# Patient Record
Sex: MALE | Race: WHITE | NOT HISPANIC OR LATINO | Employment: FULL TIME | URBAN - METROPOLITAN AREA
[De-identification: names, ages, dates, MRNs, and addresses within clinical notes are randomized per-mention and may not be internally consistent; named-entity substitution may affect disease eponyms.]

---

## 2023-03-08 PROBLEM — I80.9 THROMBOPHLEBITIS: Status: ACTIVE | Noted: 2023-03-08

## 2023-03-08 PROBLEM — R53.83 FATIGUE: Status: ACTIVE | Noted: 2023-03-08

## 2023-03-08 PROBLEM — R04.0 RECURRENT EPISTAXIS: Status: ACTIVE | Noted: 2023-03-08

## 2023-03-08 PROBLEM — I73.9 PVD (PERIPHERAL VASCULAR DISEASE) (CMS-HCC): Status: ACTIVE | Noted: 2023-03-08

## 2023-03-08 PROBLEM — I10 HYPERTENSION: Status: ACTIVE | Noted: 2023-03-08

## 2023-03-08 PROBLEM — R73.9 BLOOD GLUCOSE ELEVATED: Status: ACTIVE | Noted: 2023-03-08

## 2023-03-08 PROBLEM — R19.7 DIARRHEA: Status: ACTIVE | Noted: 2023-03-08

## 2023-03-08 PROBLEM — K64.5 THROMBOSED EXTERNAL HEMORRHOID: Status: ACTIVE | Noted: 2023-03-08

## 2023-03-08 PROBLEM — M79.672 LEFT FOOT PAIN: Status: ACTIVE | Noted: 2023-03-08

## 2023-03-08 PROBLEM — K64.5 HEMORRHOID THROMBOSIS: Status: ACTIVE | Noted: 2023-03-08

## 2023-03-08 PROBLEM — G47.30 SLEEP APNEA: Status: ACTIVE | Noted: 2023-03-08

## 2023-03-08 PROBLEM — K62.5 BRIGHT RED BLOOD PER RECTUM: Status: ACTIVE | Noted: 2023-03-08

## 2023-03-08 PROBLEM — E55.9 VITAMIN D DEFICIENCY: Status: ACTIVE | Noted: 2023-03-08

## 2023-03-08 PROBLEM — M79.89 LIMB SWELLING: Status: ACTIVE | Noted: 2023-03-08

## 2023-03-08 PROBLEM — K57.30 DIVERTICULOSIS OF LARGE INTESTINE WITHOUT HEMORRHAGE: Status: ACTIVE | Noted: 2023-03-08

## 2023-03-08 PROBLEM — K64.4 EXTERNAL HEMORRHOIDS: Status: ACTIVE | Noted: 2023-03-08

## 2023-03-08 PROBLEM — M25.579 ANKLE PAIN: Status: ACTIVE | Noted: 2023-03-08

## 2023-03-08 PROBLEM — M25.539 WRIST PAIN: Status: ACTIVE | Noted: 2023-03-08

## 2023-03-08 PROBLEM — M21.42 ACQUIRED PES PLANUS OF BOTH FEET: Status: ACTIVE | Noted: 2023-03-08

## 2023-03-08 PROBLEM — I42.9 CARDIOMYOPATHY (MULTI): Status: ACTIVE | Noted: 2023-03-08

## 2023-03-08 PROBLEM — R05.9 COUGH IN ADULT: Status: ACTIVE | Noted: 2023-03-08

## 2023-03-08 PROBLEM — I25.10 ATHEROSCLEROTIC CARDIOVASCULAR DISEASE: Status: ACTIVE | Noted: 2023-03-08

## 2023-03-08 PROBLEM — R04.0 BLOODY NOSE: Status: ACTIVE | Noted: 2023-03-08

## 2023-03-08 PROBLEM — M18.9 CMC ARTHRITIS, THUMB, DEGENERATIVE: Status: ACTIVE | Noted: 2023-03-08

## 2023-03-08 PROBLEM — R79.89 ELEVATED VITAMIN B12 LEVEL: Status: ACTIVE | Noted: 2023-03-08

## 2023-03-08 PROBLEM — R73.03 PRE-DIABETES: Status: ACTIVE | Noted: 2023-03-08

## 2023-03-08 PROBLEM — M21.41 ACQUIRED PES PLANUS OF BOTH FEET: Status: ACTIVE | Noted: 2023-03-08

## 2023-03-08 PROBLEM — I72.4 POPLITEAL ARTERY ANEURYSM (CMS-HCC): Status: ACTIVE | Noted: 2023-03-08

## 2023-03-08 PROBLEM — K52.9 GASTROENTERITIS: Status: ACTIVE | Noted: 2023-03-08

## 2023-03-08 PROBLEM — E11.42 DIABETIC PERIPHERAL NEUROPATHY (MULTI): Status: ACTIVE | Noted: 2023-03-08

## 2023-03-08 PROBLEM — M79.606 LEG PAIN: Status: ACTIVE | Noted: 2023-03-08

## 2023-03-08 PROBLEM — D12.6 TUBULAR ADENOMA OF COLON: Status: ACTIVE | Noted: 2023-03-08

## 2023-03-08 PROBLEM — R74.8 ELEVATED VITAMIN B12 LEVEL: Status: ACTIVE | Noted: 2023-03-08

## 2023-03-08 PROBLEM — Z20.822 EXPOSURE TO COVID-19 VIRUS: Status: ACTIVE | Noted: 2023-03-08

## 2023-03-08 RX ORDER — ASCORBIC ACID 500 MG
TABLET,CHEWABLE ORAL
COMMUNITY
End: 2024-03-12 | Stop reason: WASHOUT

## 2023-03-08 RX ORDER — NEBIVOLOL 10 MG/1
1 TABLET ORAL DAILY
COMMUNITY
Start: 2017-03-24 | End: 2023-08-18 | Stop reason: SDUPTHER

## 2023-03-08 RX ORDER — INDOMETHACIN 75 MG/1
75 CAPSULE, EXTENDED RELEASE ORAL 2 TIMES DAILY
COMMUNITY
Start: 2022-06-14 | End: 2024-03-12 | Stop reason: WASHOUT

## 2023-03-08 RX ORDER — METFORMIN HYDROCHLORIDE 1000 MG/1
1 TABLET ORAL
COMMUNITY
Start: 2020-11-24 | End: 2023-09-05 | Stop reason: SDUPTHER

## 2023-03-08 RX ORDER — ATORVASTATIN CALCIUM 80 MG/1
1 TABLET, FILM COATED ORAL DAILY
COMMUNITY
Start: 2021-06-24 | End: 2023-10-18 | Stop reason: SDUPTHER

## 2023-03-08 RX ORDER — TURMERIC ROOT EXTRACT 500 MG
TABLET ORAL
COMMUNITY
Start: 2019-09-20 | End: 2023-03-15 | Stop reason: ALTCHOICE

## 2023-03-08 RX ORDER — ESOMEPRAZOLE MAGNESIUM 20 MG/1
TABLET, DELAYED RELEASE ORAL
COMMUNITY

## 2023-03-08 RX ORDER — MULTIVITAMIN
TABLET ORAL
COMMUNITY

## 2023-03-08 RX ORDER — CIPROFLOXACIN 250 MG/1
1 TABLET, FILM COATED ORAL 2 TIMES DAILY
COMMUNITY
Start: 2022-06-03 | End: 2023-03-10 | Stop reason: ALTCHOICE

## 2023-03-10 LAB
ALANINE AMINOTRANSFERASE (SGPT) (U/L) IN SER/PLAS: 32 U/L (ref 10–52)
ANION GAP IN SER/PLAS: 13 MMOL/L (ref 10–20)
APPEARANCE, URINE: ABNORMAL
BASOPHILS (10*3/UL) IN BLOOD BY AUTOMATED COUNT: 0.09 X10E9/L (ref 0–0.1)
BASOPHILS/100 LEUKOCYTES IN BLOOD BY AUTOMATED COUNT: 1 % (ref 0–2)
BILIRUBIN, URINE: NEGATIVE
BLOOD, URINE: NEGATIVE
CALCIDIOL (25 OH VITAMIN D3) (NG/ML) IN SER/PLAS: 36 NG/ML
CALCIUM (MG/DL) IN SER/PLAS: 9.8 MG/DL (ref 8.6–10.6)
CARBON DIOXIDE, TOTAL (MMOL/L) IN SER/PLAS: 28 MMOL/L (ref 21–32)
CHLORIDE (MMOL/L) IN SER/PLAS: 105 MMOL/L (ref 98–107)
CHOLESTEROL (MG/DL) IN SER/PLAS: 113 MG/DL (ref 0–199)
CHOLESTEROL IN HDL (MG/DL) IN SER/PLAS: 38.5 MG/DL
CHOLESTEROL/HDL RATIO: 2.9
COBALAMIN (VITAMIN B12) (PG/ML) IN SER/PLAS: 591 PG/ML (ref 211–911)
COLOR, URINE: ABNORMAL
CREATININE (MG/DL) IN SER/PLAS: 1.01 MG/DL (ref 0.5–1.3)
EOSINOPHILS (10*3/UL) IN BLOOD BY AUTOMATED COUNT: 0.25 X10E9/L (ref 0–0.7)
EOSINOPHILS/100 LEUKOCYTES IN BLOOD BY AUTOMATED COUNT: 2.9 % (ref 0–6)
ERYTHROCYTE DISTRIBUTION WIDTH (RATIO) BY AUTOMATED COUNT: 14.7 % (ref 11.5–14.5)
ERYTHROCYTE MEAN CORPUSCULAR HEMOGLOBIN CONCENTRATION (G/DL) BY AUTOMATED: 31.2 G/DL (ref 32–36)
ERYTHROCYTE MEAN CORPUSCULAR VOLUME (FL) BY AUTOMATED COUNT: 89 FL (ref 80–100)
ERYTHROCYTES (10*6/UL) IN BLOOD BY AUTOMATED COUNT: 4.93 X10E12/L (ref 4.5–5.9)
ESTIMATED AVERAGE GLUCOSE FOR HBA1C: 137 MG/DL
GFR MALE: 88 ML/MIN/1.73M2
GLUCOSE (MG/DL) IN SER/PLAS: 107 MG/DL (ref 74–99)
GLUCOSE, URINE: NEGATIVE MG/DL
HEMATOCRIT (%) IN BLOOD BY AUTOMATED COUNT: 43.9 % (ref 41–52)
HEMOGLOBIN (G/DL) IN BLOOD: 13.7 G/DL (ref 13.5–17.5)
HEMOGLOBIN A1C/HEMOGLOBIN TOTAL IN BLOOD: 6.4 %
IMMATURE GRANULOCYTES/100 LEUKOCYTES IN BLOOD BY AUTOMATED COUNT: 0.3 % (ref 0–0.9)
KETONES, URINE: ABNORMAL MG/DL
LDL: 45 MG/DL (ref 0–99)
LEUKOCYTE ESTERASE, URINE: NEGATIVE
LEUKOCYTES (10*3/UL) IN BLOOD BY AUTOMATED COUNT: 8.6 X10E9/L (ref 4.4–11.3)
LYMPHOCYTES (10*3/UL) IN BLOOD BY AUTOMATED COUNT: 2.41 X10E9/L (ref 1.2–4.8)
LYMPHOCYTES/100 LEUKOCYTES IN BLOOD BY AUTOMATED COUNT: 28 % (ref 13–44)
MONOCYTES (10*3/UL) IN BLOOD BY AUTOMATED COUNT: 0.58 X10E9/L (ref 0.1–1)
MONOCYTES/100 LEUKOCYTES IN BLOOD BY AUTOMATED COUNT: 6.7 % (ref 2–10)
NEUTROPHILS (10*3/UL) IN BLOOD BY AUTOMATED COUNT: 5.24 X10E9/L (ref 1.2–7.7)
NEUTROPHILS/100 LEUKOCYTES IN BLOOD BY AUTOMATED COUNT: 61.1 % (ref 40–80)
NITRITE, URINE: NEGATIVE
NRBC (PER 100 WBCS) BY AUTOMATED COUNT: 0 /100 WBC (ref 0–0)
PH, URINE: 5 (ref 5–8)
PLATELETS (10*3/UL) IN BLOOD AUTOMATED COUNT: 192 X10E9/L (ref 150–450)
POTASSIUM (MMOL/L) IN SER/PLAS: 5 MMOL/L (ref 3.5–5.3)
PROSTATE SPECIFIC AG (NG/ML) IN SER/PLAS: 1.01 NG/ML (ref 0–4)
PROTEIN, URINE: NEGATIVE MG/DL
SODIUM (MMOL/L) IN SER/PLAS: 141 MMOL/L (ref 136–145)
SPECIFIC GRAVITY, URINE: 1.03 (ref 1–1.03)
THYROTROPIN (MIU/L) IN SER/PLAS BY DETECTION LIMIT <= 0.05 MIU/L: 1.13 MIU/L (ref 0.44–3.98)
TRIGLYCERIDE (MG/DL) IN SER/PLAS: 147 MG/DL (ref 0–149)
UREA NITROGEN (MG/DL) IN SER/PLAS: 18 MG/DL (ref 6–23)
UROBILINOGEN, URINE: 2 MG/DL (ref 0–1.9)
VLDL: 29 MG/DL (ref 0–40)

## 2023-03-15 ENCOUNTER — OFFICE VISIT (OUTPATIENT)
Dept: PRIMARY CARE | Facility: CLINIC | Age: 58
End: 2023-03-15
Payer: COMMERCIAL

## 2023-03-15 VITALS
HEIGHT: 75 IN | RESPIRATION RATE: 16 BRPM | HEART RATE: 66 BPM | WEIGHT: 213 LBS | DIASTOLIC BLOOD PRESSURE: 68 MMHG | BODY MASS INDEX: 26.49 KG/M2 | SYSTOLIC BLOOD PRESSURE: 124 MMHG | OXYGEN SATURATION: 97 % | TEMPERATURE: 98 F

## 2023-03-15 DIAGNOSIS — Z00.00 WELL ADULT EXAM: ICD-10-CM

## 2023-03-15 DIAGNOSIS — R60.9 EDEMA, UNSPECIFIED TYPE: ICD-10-CM

## 2023-03-15 DIAGNOSIS — R11.0 NAUSEA: Primary | ICD-10-CM

## 2023-03-15 PROCEDURE — 3078F DIAST BP <80 MM HG: CPT | Performed by: INTERNAL MEDICINE

## 2023-03-15 PROCEDURE — 99396 PREV VISIT EST AGE 40-64: CPT | Performed by: INTERNAL MEDICINE

## 2023-03-15 PROCEDURE — 1036F TOBACCO NON-USER: CPT | Performed by: INTERNAL MEDICINE

## 2023-03-15 PROCEDURE — 3074F SYST BP LT 130 MM HG: CPT | Performed by: INTERNAL MEDICINE

## 2023-03-15 PROCEDURE — 3044F HG A1C LEVEL LT 7.0%: CPT | Performed by: INTERNAL MEDICINE

## 2023-03-15 PROCEDURE — 4010F ACE/ARB THERAPY RXD/TAKEN: CPT | Performed by: INTERNAL MEDICINE

## 2023-03-15 RX ORDER — METHYLPREDNISOLONE 4 MG/1
TABLET ORAL
COMMUNITY
Start: 2022-06-14 | End: 2023-03-15 | Stop reason: ALTCHOICE

## 2023-03-15 RX ORDER — AMOXICILLIN 500 MG/1
CAPSULE ORAL
COMMUNITY
Start: 2022-12-13 | End: 2023-03-15 | Stop reason: ALTCHOICE

## 2023-03-15 RX ORDER — POLYETHYLENE GLYCOL 3350, SODIUM CHLORIDE, SODIUM BICARBONATE, POTASSIUM CHLORIDE 420; 11.2; 5.72; 1.48 G/4L; G/4L; G/4L; G/4L
POWDER, FOR SOLUTION ORAL
COMMUNITY
Start: 2022-08-16 | End: 2024-03-12 | Stop reason: WASHOUT

## 2023-03-15 RX ORDER — OLMESARTAN MEDOXOMIL 20 MG/1
TABLET ORAL
COMMUNITY
Start: 2023-03-08 | End: 2023-12-20 | Stop reason: SDUPTHER

## 2023-03-15 NOTE — ASSESSMENT & PLAN NOTE
ASSESSMENT AND PLAN: Patient on examination is in good health, will do screening blood tests to screen for high cholesterol, diabetes, thyroid. Patient should be taking enough calcium in a balanced diet or supplements to total 1200 mg a day in divided doses unless with history of specific types of kidney stones. Vitamin D 800-1000 iu a day, check levels if not taking any supplements. For Male Patients Only: Prostate cancer screening PSA. Preventive Medicine: colon cancer screening by age 50 if no family history, balanced diet, and exercise as discussed. Seat belt use for injury prevention, living will. Substance use and /or tobacco use counseled when applicable. Alcohol use discussed, use designated . Immunizations TD age 50 and every 10 years. Pneumovax and shingles vaccine counseled. Yearly flu vaccine unless contraindicated. More than 50% of office visit time spent counseling the patient, questions were answered. If problems arise, patient is to call or come back in. It is understood that the responsibility of healthcare is shared by the patient by following a healthy lifestyle and following the plan above as discussed. Complete physical examination in a year.Patient  knows to call for lab results in two weeks if he does not receive letter or call from our office.

## 2023-03-15 NOTE — PROGRESS NOTES
Patient is here for his annual exam concerned about weight loss, and having some stomach issues had bypass surgery 2009 and is worried, also has neuropathy and is wanting a specialist

## 2023-03-15 NOTE — PROGRESS NOTES
"Subjective   Patient ID: David A Schuellerman is a 57 y.o. male who presents for Annual Exam.    Patient comes in for a physical examination, doing well over - all with no particular complaints.   Also in for laboratory review and health maintenance update.  Updating family history as well.    Review of Systems    Objective   Physical Exam  Constitutional:       Appearance: Normal appearance.   HENT:      Head: Normocephalic and atraumatic.      Nose: Nose normal.   Cardiovascular:      Rate and Rhythm: Normal rate and regular rhythm.   Abdominal:      General: Abdomen is flat.      Palpations: Abdomen is soft.   Musculoskeletal:         General: Normal range of motion.      Cervical back: Normal range of motion.   Skin:     General: Skin is warm and dry.   Neurological:      Mental Status: He is alert.       /68   Pulse 66   Temp 36.7 °C (98 °F)   Resp 16   Ht 1.905 m (6' 3\")   Wt 96.6 kg (213 lb)   SpO2 97%   BMI 26.62 kg/m²         Assessment/Plan   Problem List Items Addressed This Visit          Digestive    Nausea - Primary    Relevant Orders    Referral to Gastroenterology       Other    Well adult exam     ASSESSMENT AND PLAN: Patient on examination is in good health, will do screening blood tests to screen for high cholesterol, diabetes, thyroid. Patient should be taking enough calcium in a balanced diet or supplements to total 1200 mg a day in divided doses unless with history of specific types of kidney stones. Vitamin D 800-1000 iu a day, check levels if not taking any supplements. For Male Patients Only: Prostate cancer screening PSA. Preventive Medicine: colon cancer screening by age 50 if no family history, balanced diet, and exercise as discussed. Seat belt use for injury prevention, living will. Substance use and /or tobacco use counseled when applicable. Alcohol use discussed, use designated . Immunizations TD age 50 and every 10 years. Pneumovax and shingles vaccine counseled. " Yearly flu vaccine unless contraindicated. More than 50% of office visit time spent counseling the patient, questions were answered. If problems arise, patient is to call or come back in. It is understood that the responsibility of healthcare is shared by the patient by following a healthy lifestyle and following the plan above as discussed. Complete physical examination in a year.Patient  knows to call for lab results in two weeks if he does not receive letter or call from our office.

## 2023-03-23 ENCOUNTER — TELEPHONE (OUTPATIENT)
Dept: PRIMARY CARE | Facility: CLINIC | Age: 58
End: 2023-03-23
Payer: COMMERCIAL

## 2023-03-23 NOTE — TELEPHONE ENCOUNTER
Patient called in asked if there was an alternative for his generic bystolic, it is $400.00 at pharm for 90 days, has been out of medication for two days now, thought pharm sent us a message, Advise?

## 2023-06-06 ENCOUNTER — HOSPITAL ENCOUNTER (OUTPATIENT)
Dept: DATA CONVERSION | Facility: HOSPITAL | Age: 58
End: 2023-06-06
Attending: INTERNAL MEDICINE | Admitting: INTERNAL MEDICINE
Payer: COMMERCIAL

## 2023-06-06 DIAGNOSIS — Z87.891 PERSONAL HISTORY OF NICOTINE DEPENDENCE: ICD-10-CM

## 2023-06-06 DIAGNOSIS — Z98.84 BARIATRIC SURGERY STATUS: ICD-10-CM

## 2023-06-06 DIAGNOSIS — G47.33 OBSTRUCTIVE SLEEP APNEA (ADULT) (PEDIATRIC): ICD-10-CM

## 2023-06-06 DIAGNOSIS — D50.0 IRON DEFICIENCY ANEMIA SECONDARY TO BLOOD LOSS (CHRONIC): ICD-10-CM

## 2023-06-06 DIAGNOSIS — Z12.11 ENCOUNTER FOR SCREENING FOR MALIGNANT NEOPLASM OF COLON: ICD-10-CM

## 2023-06-06 DIAGNOSIS — I10 ESSENTIAL (PRIMARY) HYPERTENSION: ICD-10-CM

## 2023-06-06 DIAGNOSIS — E11.51 TYPE 2 DIABETES MELLITUS WITH DIABETIC PERIPHERAL ANGIOPATHY WITHOUT GANGRENE (MULTI): ICD-10-CM

## 2023-06-06 DIAGNOSIS — Z79.84 LONG TERM (CURRENT) USE OF ORAL HYPOGLYCEMIC DRUGS: ICD-10-CM

## 2023-06-06 DIAGNOSIS — K21.9 GASTRO-ESOPHAGEAL REFLUX DISEASE WITHOUT ESOPHAGITIS: ICD-10-CM

## 2023-06-06 DIAGNOSIS — K57.30 DIVERTICULOSIS OF LARGE INTESTINE WITHOUT PERFORATION OR ABSCESS WITHOUT BLEEDING: ICD-10-CM

## 2023-06-06 DIAGNOSIS — I42.9 CARDIOMYOPATHY, UNSPECIFIED (MULTI): ICD-10-CM

## 2023-06-06 LAB — POCT GLUCOSE: 111 MG/DL (ref 74–99)

## 2023-08-18 DIAGNOSIS — I10 HYPERTENSION, UNSPECIFIED TYPE: Primary | ICD-10-CM

## 2023-08-18 RX ORDER — NEBIVOLOL 10 MG/1
10 TABLET ORAL DAILY
Qty: 90 TABLET | Refills: 1 | Status: SHIPPED | OUTPATIENT
Start: 2023-08-18 | End: 2024-01-23

## 2023-09-05 DIAGNOSIS — E11.65 TYPE 2 DIABETES MELLITUS WITH HYPERGLYCEMIA, WITHOUT LONG-TERM CURRENT USE OF INSULIN (MULTI): Primary | ICD-10-CM

## 2023-09-05 RX ORDER — METFORMIN HYDROCHLORIDE 1000 MG/1
1000 TABLET ORAL
Qty: 180 TABLET | Refills: 1 | Status: SHIPPED | OUTPATIENT
Start: 2023-09-05 | End: 2024-03-08

## 2023-10-18 ENCOUNTER — TELEPHONE (OUTPATIENT)
Dept: PRIMARY CARE | Facility: CLINIC | Age: 58
End: 2023-10-18
Payer: COMMERCIAL

## 2023-10-18 DIAGNOSIS — E78.2 MIXED HYPERLIPIDEMIA: Primary | ICD-10-CM

## 2023-10-18 RX ORDER — ATORVASTATIN CALCIUM 80 MG/1
80 TABLET, FILM COATED ORAL DAILY
Qty: 90 TABLET | Refills: 1 | Status: SHIPPED | OUTPATIENT
Start: 2023-10-18 | End: 2024-03-19

## 2023-12-18 RX ORDER — OLMESARTAN MEDOXOMIL 20 MG/1
20 TABLET ORAL DAILY
Qty: 90 TABLET | OUTPATIENT
Start: 2023-12-18

## 2023-12-20 DIAGNOSIS — I10 HYPERTENSION, UNSPECIFIED TYPE: Primary | ICD-10-CM

## 2023-12-20 RX ORDER — OLMESARTAN MEDOXOMIL 20 MG/1
20 TABLET ORAL DAILY
Qty: 90 TABLET | Refills: 0 | Status: SHIPPED | OUTPATIENT
Start: 2023-12-20 | End: 2024-03-19

## 2023-12-20 NOTE — TELEPHONE ENCOUNTER
Pt needs scheduled for his year visit he was last seen 10/4/22 (he is overdue)  and a stress echo per last office note from Dr. Bradshaw.  I put the order into the system.

## 2024-01-02 ENCOUNTER — OFFICE VISIT (OUTPATIENT)
Dept: PRIMARY CARE | Facility: CLINIC | Age: 59
End: 2024-01-02
Payer: COMMERCIAL

## 2024-01-02 VITALS
OXYGEN SATURATION: 99 % | HEIGHT: 75 IN | BODY MASS INDEX: 26.36 KG/M2 | HEART RATE: 60 BPM | DIASTOLIC BLOOD PRESSURE: 70 MMHG | RESPIRATION RATE: 16 BRPM | SYSTOLIC BLOOD PRESSURE: 118 MMHG | TEMPERATURE: 98.2 F | WEIGHT: 212 LBS

## 2024-01-02 DIAGNOSIS — Z48.02 VISIT FOR SUTURE REMOVAL: Primary | ICD-10-CM

## 2024-01-02 PROCEDURE — 3078F DIAST BP <80 MM HG: CPT | Performed by: INTERNAL MEDICINE

## 2024-01-02 PROCEDURE — 3074F SYST BP LT 130 MM HG: CPT | Performed by: INTERNAL MEDICINE

## 2024-01-02 PROCEDURE — 1036F TOBACCO NON-USER: CPT | Performed by: INTERNAL MEDICINE

## 2024-01-02 PROCEDURE — 99213 OFFICE O/P EST LOW 20 MIN: CPT | Performed by: INTERNAL MEDICINE

## 2024-01-02 PROCEDURE — 4010F ACE/ARB THERAPY RXD/TAKEN: CPT | Performed by: INTERNAL MEDICINE

## 2024-01-02 NOTE — PROGRESS NOTES
"Subjective   Patient ID: David A Schuellerman is a 58 y.o. male who presents for Suture / Staple Removal.  HPI    Review of Systems    Objective   Physical Exam  /70   Pulse 60   Temp 36.8 °C (98.2 °F)   Resp 16   Ht 1.905 m (6' 3\")   Wt 96.2 kg (212 lb)   SpO2 99%   BMI 26.50 kg/m²         Assessment/Plan   Problem List Items Addressed This Visit    None       Suture removal will follow,   "

## 2024-01-19 DIAGNOSIS — I10 HYPERTENSION, UNSPECIFIED TYPE: ICD-10-CM

## 2024-01-23 DIAGNOSIS — I10 HYPERTENSION, UNSPECIFIED TYPE: ICD-10-CM

## 2024-01-23 RX ORDER — BETAXOLOL 20 MG/1
20 TABLET, FILM COATED ORAL DAILY
Qty: 1 TABLET | Refills: 0 | Status: SHIPPED | OUTPATIENT
Start: 2024-01-23 | End: 2024-01-24

## 2024-01-24 RX ORDER — BETAXOLOL 20 MG/1
20 TABLET, FILM COATED ORAL DAILY
Qty: 1 TABLET | Refills: 0 | Status: SHIPPED | OUTPATIENT
Start: 2024-01-24 | End: 2024-02-13

## 2024-01-25 ENCOUNTER — APPOINTMENT (OUTPATIENT)
Dept: CARDIOLOGY | Facility: CLINIC | Age: 59
End: 2024-01-25
Payer: COMMERCIAL

## 2024-02-02 ENCOUNTER — TELEPHONE (OUTPATIENT)
Dept: PRIMARY CARE | Facility: CLINIC | Age: 59
End: 2024-02-02
Payer: COMMERCIAL

## 2024-02-02 DIAGNOSIS — I1A.0 RESISTANT HYPERTENSION: Primary | ICD-10-CM

## 2024-02-02 NOTE — TELEPHONE ENCOUNTER
Pt cannot recall name of Rx, but said [facility] tried to contact us to refill.  Advised to pt that we would investigate this afternoon and CB.  Asked pt to CB w name of Rx if he can find it.

## 2024-02-05 RX ORDER — NEBIVOLOL 10 MG/1
10 TABLET ORAL DAILY
Qty: 90 TABLET | Refills: 1 | Status: SHIPPED | OUTPATIENT
Start: 2024-02-05 | End: 2024-02-09

## 2024-02-05 RX ORDER — NEBIVOLOL 10 MG/1
10 TABLET ORAL DAILY
COMMUNITY
End: 2024-02-05 | Stop reason: SDUPTHER

## 2024-02-06 ENCOUNTER — TELEPHONE (OUTPATIENT)
Dept: PRIMARY CARE | Facility: CLINIC | Age: 59
End: 2024-02-06
Payer: COMMERCIAL

## 2024-02-07 ENCOUNTER — TELEPHONE (OUTPATIENT)
Dept: CARDIOLOGY | Facility: CLINIC | Age: 59
End: 2024-02-07
Payer: COMMERCIAL

## 2024-02-07 NOTE — TELEPHONE ENCOUNTER
LM for pt and went over stress prep. Please hold for 24 hrs prior Betaxolol and bystolic, NPO 2 hrs

## 2024-02-09 DIAGNOSIS — I1A.0 RESISTANT HYPERTENSION: ICD-10-CM

## 2024-02-09 RX ORDER — BETAXOLOL 20 MG/1
20 TABLET, FILM COATED ORAL DAILY
Qty: 1 TABLET | Refills: 0 | Status: SHIPPED | OUTPATIENT
Start: 2024-02-09 | End: 2024-02-13

## 2024-02-09 NOTE — TELEPHONE ENCOUNTER
Pt's wife called regarding Nebivolol auth.  Wants you to have   [facility] fax for [place] trial Rx:  [phone]

## 2024-02-12 DIAGNOSIS — I1A.0 RESISTANT HYPERTENSION: ICD-10-CM

## 2024-02-13 ENCOUNTER — APPOINTMENT (OUTPATIENT)
Dept: CARDIOLOGY | Facility: CLINIC | Age: 59
End: 2024-02-13
Payer: COMMERCIAL

## 2024-02-13 RX ORDER — NEBIVOLOL 10 MG/1
10 TABLET ORAL DAILY
Qty: 90 TABLET | Refills: 3 | Status: SHIPPED | OUTPATIENT
Start: 2024-02-13 | End: 2025-02-12

## 2024-03-06 ENCOUNTER — TELEPHONE (OUTPATIENT)
Dept: CARDIOLOGY | Facility: CLINIC | Age: 59
End: 2024-03-06
Payer: COMMERCIAL

## 2024-03-06 NOTE — TELEPHONE ENCOUNTER
Spoke with pt and went over stress prep. NPO 2 hrs prior, hold bystolic and betaxolol 24 hrs prior

## 2024-03-08 DIAGNOSIS — E11.65 TYPE 2 DIABETES MELLITUS WITH HYPERGLYCEMIA, WITHOUT LONG-TERM CURRENT USE OF INSULIN (MULTI): ICD-10-CM

## 2024-03-08 RX ORDER — METFORMIN HYDROCHLORIDE 1000 MG/1
1000 TABLET ORAL
Qty: 60 TABLET | Refills: 5 | Status: SHIPPED | OUTPATIENT
Start: 2024-03-08

## 2024-03-12 ENCOUNTER — HOSPITAL ENCOUNTER (OUTPATIENT)
Dept: CARDIOLOGY | Facility: CLINIC | Age: 59
Discharge: HOME | End: 2024-03-12
Payer: COMMERCIAL

## 2024-03-12 ENCOUNTER — OFFICE VISIT (OUTPATIENT)
Dept: CARDIOLOGY | Facility: CLINIC | Age: 59
End: 2024-03-12
Payer: COMMERCIAL

## 2024-03-12 VITALS
HEIGHT: 75 IN | WEIGHT: 209 LBS | HEART RATE: 66 BPM | SYSTOLIC BLOOD PRESSURE: 144 MMHG | BODY MASS INDEX: 25.99 KG/M2 | DIASTOLIC BLOOD PRESSURE: 84 MMHG

## 2024-03-12 VITALS
DIASTOLIC BLOOD PRESSURE: 84 MMHG | HEIGHT: 75 IN | WEIGHT: 209 LBS | HEART RATE: 73 BPM | BODY MASS INDEX: 25.99 KG/M2 | SYSTOLIC BLOOD PRESSURE: 144 MMHG

## 2024-03-12 DIAGNOSIS — R93.1 AGATSTON CORONARY ARTERY CALCIUM SCORE GREATER THAN 400: Primary | ICD-10-CM

## 2024-03-12 DIAGNOSIS — I71.21 ANEURYSM OF ASCENDING AORTA WITHOUT RUPTURE (CMS-HCC): ICD-10-CM

## 2024-03-12 DIAGNOSIS — I10 PRIMARY HYPERTENSION: ICD-10-CM

## 2024-03-12 DIAGNOSIS — I25.10 ATHEROSCLEROSIS OF NATIVE CORONARY ARTERY OF NATIVE HEART, UNSPECIFIED WHETHER ANGINA PRESENT: ICD-10-CM

## 2024-03-12 DIAGNOSIS — E78.00 HYPERCHOLESTEREMIA: ICD-10-CM

## 2024-03-12 DIAGNOSIS — I72.4 POPLITEAL ARTERY ANEURYSM (CMS-HCC): ICD-10-CM

## 2024-03-12 PROCEDURE — 93017 CV STRESS TEST TRACING ONLY: CPT

## 2024-03-12 PROCEDURE — 99214 OFFICE O/P EST MOD 30 MIN: CPT | Performed by: INTERNAL MEDICINE

## 2024-03-12 PROCEDURE — 4010F ACE/ARB THERAPY RXD/TAKEN: CPT | Performed by: INTERNAL MEDICINE

## 2024-03-12 PROCEDURE — 93016 CV STRESS TEST SUPVJ ONLY: CPT | Performed by: INTERNAL MEDICINE

## 2024-03-12 PROCEDURE — 3077F SYST BP >= 140 MM HG: CPT | Performed by: INTERNAL MEDICINE

## 2024-03-12 PROCEDURE — 3079F DIAST BP 80-89 MM HG: CPT | Performed by: INTERNAL MEDICINE

## 2024-03-12 PROCEDURE — 93350 STRESS TTE ONLY: CPT | Performed by: INTERNAL MEDICINE

## 2024-03-12 PROCEDURE — 93018 CV STRESS TEST I&R ONLY: CPT | Performed by: INTERNAL MEDICINE

## 2024-03-12 PROCEDURE — 1036F TOBACCO NON-USER: CPT | Performed by: INTERNAL MEDICINE

## 2024-03-12 NOTE — PROGRESS NOTES
Chief Complaint:   Coronary Artery Disease     History of Present Illness     David A Schuellerman is a 58 y.o. male presenting with for follow-up of preclinical coronary artery disease detected by coronary artery calcium scoring.   The patient is tolerating guideline-directed medical therapy with antiplatelet and statin medication and is compliant.  The patient exercises intermittently and follows a heart healthy diet.  The patient has been well since their last office appointment and is not having any anginal symptoms or dyspnea on exertion.      Review of Systems  All pertinent systems have been reviewed and are negative except for what is stated in the history of present illness.    All other systems have been reviewed and are negative and noncontributory to this patient's current ailments.  .       Previous History     Past Medical History:  He has a past medical history of Agatston coronary artery calcium score greater than 400 (03/12/2024), Aneurysm of ascending aorta without rupture (CMS/HCC) (03/12/2024), Hypercholesteremia (03/12/2024), and Morbid (severe) obesity due to excess calories (CMS/HCC).    Past Surgical History:  He has a past surgical history that includes Gastric bypass (12/08/2015); Other surgical history (11/05/2020); and CT angio abdomen pelvis w and or wo IV IV contrast (6/19/2021).      Social History:  He reports that he has quit smoking. His smoking use included cigarettes. He has never used smokeless tobacco. He reports that he does not currently use alcohol. He reports current drug use. Drug: Marijuana.    Family History:  Family History   Problem Relation Name Age of Onset    Colon cancer Mother      Other (CVA) Father      Hypertension Father          Allergies:  Oxycodone    Outpatient Medications:  Current Outpatient Medications   Medication Instructions    ASPIRIN ORAL 81 MG TABS    atorvastatin (LIPITOR) 80 mg, oral, Daily    esomeprazole magnesium (NexIUM 24HR) 20 mg  "tablet,delayed release (DR/EC) NexIUM 24HR TBEC   Refills: 0       Active    metFORMIN (GLUCOPHAGE) 1,000 mg, oral, 2 times daily with meals    multivitamin (Multiple Vitamins) tablet Multiple Vitamins Oral Tablet   Refills: 0       Active    nebivolol (BYSTOLIC) 10 mg, oral, Daily    olmesartan (BENICAR) 20 mg, oral, Daily       Physical Examination   Vitals:  Visit Vitals  /84 (BP Location: Right arm)   Pulse 73   Ht 1.905 m (6' 3\")   Wt 94.8 kg (209 lb)   BMI 26.12 kg/m²   Smoking Status Former   BSA 2.24 m²    Physical Exam  Vitals reviewed.   Constitutional:       General: He is not in acute distress.     Appearance: Normal appearance.   HENT:      Head: Normocephalic and atraumatic.      Nose: Nose normal.   Eyes:      Conjunctiva/sclera: Conjunctivae normal.   Cardiovascular:      Rate and Rhythm: Normal rate and regular rhythm.      Pulses: Normal pulses.      Heart sounds: No murmur heard.  Pulmonary:      Effort: Pulmonary effort is normal. No respiratory distress.      Breath sounds: Normal breath sounds. No wheezing, rhonchi or rales.   Abdominal:      General: Bowel sounds are normal. There is no distension.      Palpations: Abdomen is soft.      Tenderness: There is no abdominal tenderness.   Musculoskeletal:         General: No swelling.      Right lower le+ Edema present.      Left lower leg: No edema.   Skin:     General: Skin is warm and dry.      Capillary Refill: Capillary refill takes less than 2 seconds.   Neurological:      General: No focal deficit present.      Mental Status: He is alert.   Psychiatric:         Mood and Affect: Mood normal.            Labs/Imaging/Cardiac Studies     Last Labs:  CBC -  Lab Results   Component Value Date    WBC 8.6 03/10/2023    HGB 13.7 03/10/2023    HCT 43.9 03/10/2023    MCV 89 03/10/2023     03/10/2023       CMP -  Lab Results   Component Value Date    CALCIUM 9.8 03/10/2023    PHOS 2.7 2018    PROT 6.6 2021    ALBUMIN 4.1 " 06/19/2021    AST 16 06/19/2021    ALT 32 03/10/2023    ALKPHOS 50 06/19/2021    BILITOT 0.8 06/19/2021       LIPID PANEL -   Lab Results   Component Value Date    CHOL 113 03/10/2023    HDL 38.5 (A) 03/10/2023    CHHDL 2.9 03/10/2023    VLDL 29 03/10/2023    TRIG 147 03/10/2023    NHDL 142 08/20/2021       RENAL FUNCTION PANEL -   Lab Results   Component Value Date    K 5.0 03/10/2023    PHOS 2.7 04/07/2018       Lab Results   Component Value Date    BNP 33 06/19/2021    HGBA1C 6.4 (A) 03/10/2023       ECG:    Stress Echo: Normal  No echocardiogram results found for the past 12 months       Assessment and Recommendations     Assessment/Plan     1. Agatston coronary artery calcium score greater than 400  CAD: The patient's CAD, as detailed in the HPI, has been clinically stable, without any anginal symptoms or dyspnea.  The patient will continue treatment with guideline-directed medical therapy with antiplatelet and statin medications and will continue regular exercise and a heart healthy diet.         2. Aneurysm of ascending aorta without rupture (CMS/HCC)  Thoracic aortic aneurysm without rupture: Stable and asymptomatic ascending aortic aneurysm.  There is no indication for surgical repair. The patient is tolerating their anti-hypertensive medications including beta blocker and/or ACE/ARB when appropriate to limit expansion and is achieving a goal blood pressure of less than 130/80.  The patient will be schedule for annual surveillance imaging.      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    4. Primary hypertension  Hypertension: The patient's blood pressure has been well-controlled at today's appointment or by recent primary care provider's measurements/home measurements and meets their goal blood pressure per the ACC/AHA guidelines.  The patient has been compliant with their anti-hypertensive medications and is following a low sodium/DASH diet and performs regular exercise.  I advised  continuation of their present medical treatment and lifestyle modification.        5. Hypercholesteremia  Hyperlipidemia: The patient's lipids are well controlled on chronic statin therapy and they are meeting their goal LDL cholesterol per the ACC/AHA guidelines.  The patient is compliant and tolerating statin therapy and is following a heart health diet and performs regular exercise.  The benefits of lipid lowering therapy have been discussed with the patient/family/caregiver.     Chest CTA with contrast  Follow up 1 year       Mundo Bradshaw MD    Exclusive of any other services or procedures performed, I, Mundo Bradshaw MD , spent 30 minutes in duration for this visit today.  This time consisted of chart review, obtaining history, and/or performing the exam as documented above as well as documenting the clinical information for the encounter in the electronic record, discussing treatment options, plans, and/or goals with patient, family, and/or caregiver, refilling medications, updating the electronic record, ordering medicines, lab work, imaging, referrals, and/or procedures as documented above and communicating with other [facility] professionals. I have discussed the results of laboratory, radiology, and cardiology studies with the patient and their family/caregiver.

## 2024-03-19 DIAGNOSIS — I10 HYPERTENSION, UNSPECIFIED TYPE: ICD-10-CM

## 2024-03-19 DIAGNOSIS — E78.2 MIXED HYPERLIPIDEMIA: ICD-10-CM

## 2024-03-19 RX ORDER — OLMESARTAN MEDOXOMIL 20 MG/1
20 TABLET ORAL DAILY
Qty: 90 TABLET | Refills: 3 | Status: SHIPPED | OUTPATIENT
Start: 2024-03-19

## 2024-03-19 RX ORDER — ATORVASTATIN CALCIUM 80 MG/1
80 TABLET, FILM COATED ORAL DAILY
Qty: 90 TABLET | Refills: 2 | Status: SHIPPED | OUTPATIENT
Start: 2024-03-19

## 2024-03-24 ENCOUNTER — HOSPITAL ENCOUNTER (OUTPATIENT)
Dept: RADIOLOGY | Facility: EXTERNAL LOCATION | Age: 59
Discharge: HOME | End: 2024-03-24
Payer: COMMERCIAL

## 2024-03-24 DIAGNOSIS — M71.022 ABSCESS OF BURSA OF LEFT ELBOW: ICD-10-CM

## 2024-03-28 ENCOUNTER — HOSPITAL ENCOUNTER (OUTPATIENT)
Dept: RADIOLOGY | Facility: CLINIC | Age: 59
Discharge: HOME | End: 2024-03-28
Payer: COMMERCIAL

## 2024-03-28 ENCOUNTER — LAB (OUTPATIENT)
Dept: LAB | Facility: LAB | Age: 59
End: 2024-03-28
Payer: COMMERCIAL

## 2024-03-28 DIAGNOSIS — I72.4 POPLITEAL ARTERY ANEURYSM (CMS-HCC): ICD-10-CM

## 2024-03-28 DIAGNOSIS — E78.00 HYPERCHOLESTEREMIA: ICD-10-CM

## 2024-03-28 DIAGNOSIS — R93.1 AGATSTON CORONARY ARTERY CALCIUM SCORE GREATER THAN 400: ICD-10-CM

## 2024-03-28 DIAGNOSIS — I10 PRIMARY HYPERTENSION: ICD-10-CM

## 2024-03-28 DIAGNOSIS — I71.21 ANEURYSM OF ASCENDING AORTA WITHOUT RUPTURE (CMS-HCC): ICD-10-CM

## 2024-03-28 PROCEDURE — 71275 CT ANGIOGRAPHY CHEST: CPT | Performed by: INTERNAL MEDICINE

## 2024-03-28 PROCEDURE — 36415 COLL VENOUS BLD VENIPUNCTURE: CPT

## 2024-03-28 PROCEDURE — 2550000001 HC RX 255 CONTRASTS: Performed by: INTERNAL MEDICINE

## 2024-03-28 PROCEDURE — 71275 CT ANGIOGRAPHY CHEST: CPT

## 2024-03-28 RX ADMIN — IOHEXOL 75 ML: 350 INJECTION, SOLUTION INTRAVENOUS at 08:45

## 2024-04-04 ENCOUNTER — TELEPHONE (OUTPATIENT)
Dept: CARDIOLOGY | Facility: CLINIC | Age: 59
End: 2024-04-04
Payer: COMMERCIAL

## 2024-04-04 NOTE — TELEPHONE ENCOUNTER
----- Message from True Fuchs CMA sent at 4/2/2024  8:22 AM EDT -----  LM to call office, Per EH: Aorta is stable on CTA -4.3 cm.  Will do MRA next year.  See me 1 year  ----- Message -----  From: Mundo Bradshaw MD  Sent: 4/1/2024   3:47 PM EDT  To: [address] Clinical Support Staff    Aorta is stable on CTA -4.3 cm.  Will do MRA next year.  See me 1 year  ----- Message -----  From: Interface, Radiology Results In  Sent: 3/28/2024   6:18 PM EDT  To: Mundo Bradshaw MD

## 2025-01-02 DIAGNOSIS — E78.2 MIXED HYPERLIPIDEMIA: ICD-10-CM

## 2025-01-02 RX ORDER — ATORVASTATIN CALCIUM 80 MG/1
80 TABLET, FILM COATED ORAL DAILY
Qty: 90 TABLET | Refills: 2 | Status: SHIPPED | OUTPATIENT
Start: 2025-01-02

## 2025-03-11 ENCOUNTER — TELEPHONE (OUTPATIENT)
Dept: CARDIOLOGY | Facility: CLINIC | Age: 60
End: 2025-03-11
Payer: COMMERCIAL

## 2025-03-11 DIAGNOSIS — I25.10 ATHEROSCLEROTIC CARDIOVASCULAR DISEASE: Primary | ICD-10-CM

## 2025-03-11 DIAGNOSIS — I71.21 ANEURYSM OF ASCENDING AORTA WITHOUT RUPTURE (CMS-HCC): Primary | ICD-10-CM

## 2025-03-13 ENCOUNTER — APPOINTMENT (OUTPATIENT)
Dept: CARDIOLOGY | Facility: CLINIC | Age: 60
End: 2025-03-13
Payer: COMMERCIAL

## 2025-03-15 DIAGNOSIS — I1A.0 RESISTANT HYPERTENSION: ICD-10-CM

## 2025-03-17 RX ORDER — NEBIVOLOL 10 MG/1
10 TABLET ORAL DAILY
Qty: 90 TABLET | Refills: 0 | Status: SHIPPED | OUTPATIENT
Start: 2025-03-17

## 2025-04-05 DIAGNOSIS — I10 HYPERTENSION, UNSPECIFIED TYPE: ICD-10-CM

## 2025-04-07 RX ORDER — OLMESARTAN MEDOXOMIL 20 MG/1
20 TABLET ORAL DAILY
Qty: 90 TABLET | Refills: 0 | Status: SHIPPED | OUTPATIENT
Start: 2025-04-07

## 2025-04-09 ENCOUNTER — OFFICE VISIT (OUTPATIENT)
Dept: CARDIOLOGY | Facility: CLINIC | Age: 60
End: 2025-04-09
Payer: COMMERCIAL

## 2025-04-09 ENCOUNTER — HOSPITAL ENCOUNTER (OUTPATIENT)
Dept: CARDIOLOGY | Facility: CLINIC | Age: 60
Discharge: HOME | End: 2025-04-09
Payer: COMMERCIAL

## 2025-04-09 VITALS
BODY MASS INDEX: 28.1 KG/M2 | DIASTOLIC BLOOD PRESSURE: 79 MMHG | SYSTOLIC BLOOD PRESSURE: 133 MMHG | HEIGHT: 75 IN | TEMPERATURE: 98.3 F | WEIGHT: 226 LBS | HEART RATE: 71 BPM

## 2025-04-09 DIAGNOSIS — I71.21 ANEURYSM OF ASCENDING AORTA WITHOUT RUPTURE: ICD-10-CM

## 2025-04-09 DIAGNOSIS — I10 PRIMARY HYPERTENSION: ICD-10-CM

## 2025-04-09 DIAGNOSIS — E78.00 HYPERCHOLESTEREMIA: ICD-10-CM

## 2025-04-09 DIAGNOSIS — I72.4 POPLITEAL ARTERY ANEURYSM (CMS-HCC): ICD-10-CM

## 2025-04-09 DIAGNOSIS — R93.1 AGATSTON CORONARY ARTERY CALCIUM SCORE GREATER THAN 400: ICD-10-CM

## 2025-04-09 LAB
AORTIC VALVE PEAK VELOCITY: 1.63 M/S
AV PEAK GRADIENT: 11 MMHG
AVA (PEAK VEL): 4.29 CM2
EJECTION FRACTION APICAL 4 CHAMBER: 72.4
EJECTION FRACTION: 75 %
LEFT ATRIUM VOLUME AREA LENGTH INDEX BSA: 29 ML/M2
LEFT VENTRICLE INTERNAL DIMENSION DIASTOLE: 4.12 CM (ref 3.5–6)
LEFT VENTRICULAR OUTFLOW TRACT DIAMETER: 2.4 CM
MITRAL VALVE E/A RATIO: 0.9
RIGHT VENTRICLE FREE WALL PEAK S': 15 CM/S
RIGHT VENTRICLE PEAK SYSTOLIC PRESSURE: 22.5 MMHG
TRICUSPID ANNULAR PLANE SYSTOLIC EXCURSION: 3.1 CM

## 2025-04-09 PROCEDURE — 93306 TTE W/DOPPLER COMPLETE: CPT | Performed by: INTERNAL MEDICINE

## 2025-04-09 PROCEDURE — 99214 OFFICE O/P EST MOD 30 MIN: CPT | Performed by: INTERNAL MEDICINE

## 2025-04-09 PROCEDURE — 3075F SYST BP GE 130 - 139MM HG: CPT | Performed by: INTERNAL MEDICINE

## 2025-04-09 PROCEDURE — 3008F BODY MASS INDEX DOCD: CPT | Performed by: INTERNAL MEDICINE

## 2025-04-09 PROCEDURE — 4010F ACE/ARB THERAPY RXD/TAKEN: CPT | Performed by: INTERNAL MEDICINE

## 2025-04-09 PROCEDURE — 99214 OFFICE O/P EST MOD 30 MIN: CPT | Mod: 25 | Performed by: INTERNAL MEDICINE

## 2025-04-09 PROCEDURE — 1036F TOBACCO NON-USER: CPT | Performed by: INTERNAL MEDICINE

## 2025-04-09 PROCEDURE — 93306 TTE W/DOPPLER COMPLETE: CPT

## 2025-04-09 PROCEDURE — 3078F DIAST BP <80 MM HG: CPT | Performed by: INTERNAL MEDICINE

## 2025-04-09 RX ORDER — ZINC GLUCONATE 50 MG
TABLET ORAL DAILY
COMMUNITY

## 2025-04-09 NOTE — PROGRESS NOTES
Chief Complaint:   Coronary Artery Disease     History of Present Illness     David A Schuellerman is a 59 y.o. male presenting with for follow-up of preclinical coronary artery disease detected by coronary artery calcium scoring (931 in 2020).   The patient is tolerating guideline-directed medical therapy with antiplatelet and statin medication and is compliant.  The patient follows a heart healthy diet.  The patient has been well since their last office appointment and is not having any anginal symptoms or dyspnea on exertion.  ASX 4.3 cm ascending aortic by CT 3/28/24. RLE larger than LLE but no claudication from popliteal aneurysm s/p repair.    Review of Systems  All pertinent systems have been reviewed and are negative except for what is stated in the history of present illness.    All other systems have been reviewed and are negative and noncontributory to this patient's current ailments.  .       Previous History     Past Medical History:  He has a past medical history of Agatston coronary artery calcium score greater than 400 (03/12/2024), Aneurysm of ascending aorta without rupture (03/12/2024), Hypercholesteremia (03/12/2024), and Morbid (severe) obesity due to excess calories (Multi).    Past Surgical History:  He has a past surgical history that includes Gastric bypass (12/08/2015); Other surgical history (11/05/2020); and CT angio abdomen pelvis w and or wo IV IV contrast (6/19/2021).      Social History:  He reports that he has quit smoking. His smoking use included cigarettes. He has never used smokeless tobacco. He reports that he does not currently use alcohol. He reports current drug use. Drug: Marijuana.    Family History:  Family History   Problem Relation Name Age of Onset    Colon cancer Mother      Other (CVA) Father      Hypertension Father          Allergies:  Oxycodone    Outpatient Medications:  Current Outpatient Medications   Medication Instructions    ASPIRIN ORAL 81 MG TABS     "atorvastatin (LIPITOR) 80 mg, oral, Daily    esomeprazole magnesium (NexIUM 24HR) 20 mg tablet,delayed release (DR/EC) NexIUM 24HR TBEC   Refills: 0       Active    metFORMIN (GLUCOPHAGE) 1,000 mg, oral, 2 times daily (morning and late afternoon)    multivitamin (Multiple Vitamins) tablet Multiple Vitamins Oral Tablet   Refills: 0       Active    nebivolol (BYSTOLIC) 10 mg, oral, Daily    olmesartan (BENICAR) 20 mg, oral, Daily    zinc gluconate 50 mg tablet Daily       Physical Examination   Vitals:  Visit Vitals  /79 (BP Location: Right arm)   Pulse 71   Temp 36.8 °C (98.3 °F)   Ht 1.905 m (6' 3\")   Wt 103 kg (226 lb)   BMI 28.25 kg/m²   Smoking Status Former   BSA 2.33 m²    Physical Exam  Vitals reviewed.   Constitutional:       General: He is not in acute distress.     Appearance: Normal appearance.   HENT:      Head: Normocephalic and atraumatic.      Nose: Nose normal.   Eyes:      Conjunctiva/sclera: Conjunctivae normal.   Cardiovascular:      Rate and Rhythm: Normal rate and regular rhythm.      Pulses: Normal pulses.      Heart sounds: No murmur heard.  Pulmonary:      Effort: Pulmonary effort is normal. No respiratory distress.      Breath sounds: Normal breath sounds. No wheezing, rhonchi or rales.   Abdominal:      General: Bowel sounds are normal. There is no distension.      Palpations: Abdomen is soft.      Tenderness: There is no abdominal tenderness.   Musculoskeletal:         General: No swelling.      Right lower le+ Edema present.      Left lower leg: No edema.   Skin:     General: Skin is warm and dry.      Capillary Refill: Capillary refill takes less than 2 seconds.   Neurological:      General: No focal deficit present.      Mental Status: He is alert.   Psychiatric:         Mood and Affect: Mood normal.            Labs/Imaging/Cardiac Studies     Last Labs:  CBC -  Lab Results   Component Value Date    WBC 8.6 03/10/2023    HGB 13.7 03/10/2023    HCT 43.9 03/10/2023    MCV 89 " 03/10/2023     03/10/2023       CMP -  Lab Results   Component Value Date    CALCIUM 9.8 03/10/2023    PHOS 2.7 04/07/2018    PROT 6.6 06/19/2021    ALBUMIN 4.1 06/19/2021    AST 16 06/19/2021    ALT 32 03/10/2023    ALKPHOS 50 06/19/2021    BILITOT 0.8 06/19/2021       LIPID PANEL -   Lab Results   Component Value Date    CHOL 113 03/10/2023    HDL 38.5 (A) 03/10/2023    CHHDL 2.9 03/10/2023    VLDL 29 03/10/2023    TRIG 147 03/10/2023    NHDL 142 08/20/2021       RENAL FUNCTION PANEL -   Lab Results   Component Value Date    K 5.0 03/10/2023    PHOS 2.7 04/07/2018       Lab Results   Component Value Date    BNP 33 06/19/2021    HGBA1C 6.4 (A) 03/10/2023       Reviewed Echo and Labs, CT 3/28/24       No echocardiogram results found for the past 12 months       Assessment and Recommendations     Assessment/Plan     1. Agatston coronary artery calcium score greater than 400  CAD: The patient's CAD, as detailed in the HPI, has been clinically stable, without any anginal symptoms or dyspnea.  The patient will continue treatment with guideline-directed medical therapy with ASA and statin medications and will continue regular exercise and a heart healthy diet.         2. Aneurysm of ascending aorta without rupture (CMS/HCC)  Thoracic aortic aneurysm without rupture: Stable and asymptomatic ascending 4.3 cm aortic aneurysm.  There is no indication for surgical repair. The patient is tolerating their anti-hypertensive medications including beta blocker and/or ACE/ARB when appropriate to limit expansion and is achieving a goal blood pressure of less than 130/80.  The patient will be schedule for annual surveillance imaging.      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    4. Primary hypertension  Hypertension: The patient's blood pressure has been well-controlled at today's appointment or by recent primary care provider's measurements/home measurements and meets their goal blood pressure per  the ACC/AHA guidelines.  The patient has been compliant with their anti-hypertensive medications and is following a low sodium/DASH diet and performs regular exercise.  I advised continuation of their present medical treatment and lifestyle modification.      5. Hypercholesteremia  Hyperlipidemia: The patient's lipids are well controlled on chronic atorvastatin therapy and they are meeting their goal LDL cholesterol per the ACC/AHA guidelines.  The patient is compliant and tolerating statin therapy and is following a heart health diet and performs regular exercise.  The benefits of lipid lowering therapy have been discussed with the patient/family/caregiver.     Carmine JORGENSEN Joejoey will return in 1 year for an office visit and Echo.          Mundo Bradshaw MD    Exclusive of any other services or procedures performed, I, Mundo Bradshaw MD , spent 30 minutes in duration for this visit today.  This time consisted of chart review, obtaining history, and/or performing the exam as documented above as well as documenting the clinical information for the encounter in the electronic record, discussing treatment options, plans, and/or goals with patient, family, and/or caregiver, refilling medications, updating the electronic record, ordering medicines, lab work, imaging, referrals, and/or procedures as documented above and communicating with other [facility] professionals. I have discussed the results of laboratory, radiology, and cardiology studies with the patient and their family/caregiver.

## 2025-06-13 DIAGNOSIS — I1A.0 RESISTANT HYPERTENSION: ICD-10-CM

## 2025-06-14 RX ORDER — NEBIVOLOL 10 MG/1
10 TABLET ORAL DAILY
Qty: 30 TABLET | Refills: 0 | Status: SHIPPED | OUTPATIENT
Start: 2025-06-14

## 2025-07-24 DIAGNOSIS — I1A.0 RESISTANT HYPERTENSION: ICD-10-CM

## 2025-07-24 RX ORDER — NEBIVOLOL 10 MG/1
10 TABLET ORAL DAILY
Qty: 30 TABLET | Refills: 0 | Status: SHIPPED | OUTPATIENT
Start: 2025-07-24

## 2025-08-18 DIAGNOSIS — I1A.0 RESISTANT HYPERTENSION: ICD-10-CM

## 2025-08-19 RX ORDER — NEBIVOLOL 10 MG/1
10 TABLET ORAL DAILY
Qty: 30 TABLET | Refills: 0 | Status: SHIPPED | OUTPATIENT
Start: 2025-08-19 | End: 2025-08-20 | Stop reason: SDUPTHER

## 2025-08-20 DIAGNOSIS — I1A.0 RESISTANT HYPERTENSION: ICD-10-CM

## 2025-08-20 RX ORDER — NEBIVOLOL 10 MG/1
10 TABLET ORAL DAILY
Qty: 30 TABLET | Refills: 0 | Status: SHIPPED | OUTPATIENT
Start: 2025-08-20